# Patient Record
(demographics unavailable — no encounter records)

---

## 2025-02-04 NOTE — ASSESSMENT
[FreeTextEntry1] : The condition was explained to the patient.  Discussed risks and benefits of surgical vs non-surgical treatment. Plan to proceed with non-surgical treatment. Patient understands that there will be permanent deformity at the fracture site and increased risk of post-traumatic arthritis due to the mal-alignment. We discussed the importance of good function over good X-rays and that performing surgery may lead to unnecessary scar tissue formation. Risks include, but are not limited to persistent pain, stiffness, post-traumatic arthritis, fracture displacement, need for future surgery, mal-union, non-union. Risk of acute carpal tunnel syndrome, which may require surgery. Risk of delayed tendon rupture, which may require surgery. All patient questions were answered. Patient expressed understanding and would like to proceed with non-surgical treatment. - Recommend wrist brace, full time except hygiene. Fit for and provided brace in office today.  - encouraged HEP (making a fist) to reduce digital stiffness.  - elevate wrist above level of heart as much as possible to reduce swelling. - NWB to R hand.   F/u 2 weeks. X-rays of R wrist.  FORMAL REQUEST FOR AUTHORIZATION FOR DME - RIGHT COCKUP WRIST BRACE.

## 2025-02-04 NOTE — WORK
[Fracture] : fracture [Was the competent medical cause of the injury] : was the competent medical cause of the injury [Are consistent with the injury] : are consistent with the injury [Consistent with my objective findings] : consistent with my objective findings [I provided the services listed above] :  I provided the services listed above.

## 2025-02-04 NOTE — HISTORY OF PRESENT ILLNESS
[Work related] : work related [de-identified] : WC DOI 1/7/25 Occupation: Manager at construction site  2/4/25: 57yo male (RHD) presents for RIGHT wrist pain after an injury at work on 1/7/25. He was getting out of a van, slipped on ice, and fell onto that hand. Went to an outside physician on 1/16/25 => XR, recommended f/u with hand surgeon in 1 week, but patient was unable to follow up due to illness. Went to [facility] ER on 1/8/25 => XR, sugartong splint. Pain has gotten much better. Denies numbness/tingling.  + Sachi Pearl.  Reports h/o RIGHT thumb injury 6-7 years ago. Denies prior issue / injury to RIGHT wrist. 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  Hx: SIS. [FreeTextEntry3] : 01/07/25 [FreeTextEntry5] : DEBORAH walker [RHD] 56 year old male is here today for evaluation of RIGHT wrist injury after falling out of the back of a work van on 01/07/25. states he went to urgent care on [facility] on 01/08/25 +xrays and splint.

## 2025-02-04 NOTE — IMAGING
[de-identified] : RIGHT HAND skin intact. moderate swelling of wrist. TTP to distal radius. good EPL, FPL. good finger extension, flex to full fist. good finger abduction and adduction.  SILT to median, ulnar, radial distribution.  palpable radial pulse, brisk cap refill all digits. no triggering.  I independently reviewed and interpreted outside XRAYS OF RIGHT FOREARM @[facility] 1/8/25 (2 views - PA AND LATERAL VIEWS): comminuted minimally displaced distal radius intra-articular fx with mild dorsal angulation. well-corticated ossification adjacent to ulnar styloid, likely sequela of remote fx. I independently reviewed and interpreted outside XRAYS OF RIGHT WRIST@[facility] 1/8/25 (3 views - PA, OBLIQUE, AND LATERAL VIEWS): comminuted minimally displaced distal radius intra-articular fx with mild dorsal angulation. well-corticated ossification adjacent to ulnar styloid, likely sequela of remote fx.  I independently reviewed and interpreted outside XRAYS OF RIGHT HAND @[facility] 1/8/25 (3 views - PA, OBLIQUE, AND LATERAL VIEWS): comminuted minimally displaced distal radius intra-articular fx with mild dorsal angulation. well-corticated ossification adjacent to ulnar styloid, likely sequela of remote fx. multiple small bone fragments at tip of thumb, likely sequela of remote fx. evaluation of digits limited due to overlap on lateral view.  XRAYS OF RIGHT WRIST @TODAY (3 views - PA, OBLIQUE, AND LATERAL VIEWS): stable position/alignment with interval healing of comminuted minimally displaced distal radius intra-articular fx. well-corticated ossification adjacent to ulnar styloid, likely sequela of remote fx.

## 2025-04-01 NOTE — ASSESSMENT
[FreeTextEntry1] : - continue HEP until stiffness resolved. - advance activity as pain allows.  F/u 2 months.  FORMAL REQUEST FOR AUTHORIZATION FOR OCCUPATIONAL THERAPY FOR RIGHT WRIST.

## 2025-04-01 NOTE — IMAGING
[de-identified] : RIGHT HAND skin intact. mild swelling of wrist. no TTP. wrist ROM: good extension, limited flexion. good pronation, supination. good EPL, FPL. good finger extension, flex to full fist. good finger abduction and adduction.  SILT to median, ulnar, radial distribution.  palpable radial pulse, brisk cap refill all digits. no triggering.  @4/1/25 XRAYS OF RIGHT WRIST (3 views - PA, OBLIQUE, AND LATERAL VIEWS): stable position/alignment with interval healing of comminuted minimally displaced distal radius intra-articular fx. well-corticated ossification adjacent to ulnar styloid, likely sequela of remote fx.

## 2025-04-01 NOTE — HISTORY OF PRESENT ILLNESS
[Work related] : work related [de-identified] : WC DOI 1/7/25 Occupation: Manager at construction site  4/1/25: 12 weeks s/p RIGHT distal radius intra-articular fx from 1/7/25. d/c'ed wrist brace. doing well, mild intermittent radial/ulnar wrist pain. Did not schedule therapy.  2/18/25: 6 weeks s/p RIGHT distal radius intra-articular fx from 1/7/25. in wrist brace full time. reports pain is mostly at night.  2/4/25: 55yo male (RHD) presents for RIGHT wrist pain after an injury at work on 1/7/25. He was getting out of a van, slipped on ice, and fell onto that hand. Went to an outside physician on 1/16/25 => XR, recommended f/u with hand surgeon in 1 week, but patient was unable to follow up due to illness. Went to [facility] ER on 1/8/25 => XR, sugartong splint. Pain has gotten much better. Denies numbness/tingling.  + Sachi Pearl.  Reports h/o RIGHT thumb injury 6-7 years ago. Denies prior issue / injury to RIGHT wrist. 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  Hx: SIS. [FreeTextEntry3] : 01/07/25  [FreeTextEntry5] : DEBORAH is here today to follow up on his RIGHT wrist. pt states there are no changes in their symptoms since the last visit. not attending therapy.